# Patient Record
Sex: MALE | Employment: FULL TIME | URBAN - METROPOLITAN AREA
[De-identification: names, ages, dates, MRNs, and addresses within clinical notes are randomized per-mention and may not be internally consistent; named-entity substitution may affect disease eponyms.]

---

## 2023-05-05 ENCOUNTER — TELEPHONE (OUTPATIENT)
Dept: GASTROENTEROLOGY | Facility: CLINIC | Age: 63
End: 2023-05-05
Payer: COMMERCIAL

## 2023-05-05 VITALS — WEIGHT: 184 LBS | BODY MASS INDEX: 25.76 KG/M2 | HEIGHT: 71 IN

## 2023-05-05 DIAGNOSIS — Z12.11 SCREENING FOR COLON CANCER: Primary | ICD-10-CM

## 2023-05-05 RX ORDER — SIMVASTATIN 40 MG/1
TABLET, FILM COATED ORAL
COMMUNITY
Start: 2023-04-25

## 2023-05-05 NOTE — TELEPHONE ENCOUNTER
----- Message from Radha Israel sent at 5/2/2023  2:24 PM CDT -----  Regarding: FW: appt access  Contact: pt    ----- Message -----  From: Maricruz Tan  Sent: 4/25/2023   2:13 PM CDT  To: [facility] Procedure Scheduling  Subject: appt access                                      Pt is calling to schedule his colonoscopy with Dr Morales. Please call back at [phone]//thank you acc

## 2023-05-08 RX ORDER — SODIUM, POTASSIUM,MAG SULFATES 17.5-3.13G
1 SOLUTION, RECONSTITUTED, ORAL ORAL ONCE
Qty: 1 KIT | Refills: 0 | Status: SHIPPED | OUTPATIENT
Start: 2023-05-08 | End: 2023-05-08

## 2023-06-09 DIAGNOSIS — Z12.11 SCREENING FOR COLON CANCER: Primary | ICD-10-CM

## 2023-06-09 NOTE — PROGRESS NOTES
"[facility] [facility]enter[facility]  [address] [address]  Phone: [phone]  Fax: [phone]    History & Physical         Provider: Dr. Alessandro Morales    Patient Name: Mike MCCORMICK (age):1960  63 y.o.           Gender: male   Phone: [phone]     Referring Physician: Berhane Henderson     Vital Signs:   Height - 5' 11"  Weight - 184 lbs  BMI -  25.7    Plan: Colonoscopy    Encounter Diagnosis   Name Primary?    Screening for colon cancer Yes           History:      Past Medical History:   Diagnosis Date    BMI 25.0-25.9,adult     High cholesterol     Internal hemorrhoids       Past Surgical History:   Procedure Laterality Date    COLONOSCOPY  2013    KIDNEY STONE SURGERY      WISDOM TOOTH EXTRACTION        Medication List with Changes/Refills   Current Medications    SIMVASTATIN (ZOCOR) 40 MG TABLET          Review of patient's allergies indicates:   Allergen Reactions    Iodine       Family History   Problem Relation Age of Onset    Ulcerative colitis Neg Hx     Pancreatic cancer Neg Hx     Liver disease Neg Hx     Colon cancer Neg Hx     Throat cancer Neg Hx     Esophageal cancer Neg Hx     Crohn's disease Neg Hx     Stomach cancer Neg Hx       Social History     Tobacco Use    Smoking status: Former     Types: Cigarettes     Quit date: 2019     Years since quittin.4    Smokeless tobacco: Never   Substance Use Topics    Alcohol use: Never    Drug use: Never        Physical Examination:     General Appearance:___________________________  HEENT: " _____________________________________  Abdomen:____________________________________  Heart:________________________________________  Lungs:_______________________________________  Extremities:___________________________________  Skin:_________________________________________  Endocrine:____________________________________  Genitourinary:_________________________________  Neurological:__________________________________      Patient has been evaluated immediately prior to sedation and is medically cleared for endoscopy with IVCS as an ASA class: ______      Physician Signature: _________________________       Date: ________  Time: ________

## 2023-06-12 ENCOUNTER — OUTSIDE PLACE OF SERVICE (OUTPATIENT)
Dept: GASTROENTEROLOGY | Facility: CLINIC | Age: 63
End: 2023-06-12

## 2023-06-12 LAB — CRC RECOMMENDATION EXT: NORMAL

## 2023-06-12 PROCEDURE — 45385 PR COLONOSCOPY,REMV LESN,SNARE: ICD-10-PCS | Mod: 33,,, | Performed by: INTERNAL MEDICINE

## 2023-06-12 PROCEDURE — 45385 COLONOSCOPY W/LESION REMOVAL: CPT | Mod: 33,,, | Performed by: INTERNAL MEDICINE

## 2023-07-18 ENCOUNTER — DOCUMENTATION ONLY (OUTPATIENT)
Dept: GASTROENTEROLOGY | Facility: CLINIC | Age: 63
End: 2023-07-18
Payer: COMMERCIAL